# Patient Record
Sex: MALE | Race: OTHER | Employment: FULL TIME | URBAN - METROPOLITAN AREA
[De-identification: names, ages, dates, MRNs, and addresses within clinical notes are randomized per-mention and may not be internally consistent; named-entity substitution may affect disease eponyms.]

---

## 2018-10-09 ENCOUNTER — HOSPITAL ENCOUNTER (EMERGENCY)
Facility: HOSPITAL | Age: 33
Discharge: HOME | End: 2018-10-09
Attending: EMERGENCY MEDICINE
Payer: COMMERCIAL

## 2018-10-09 ENCOUNTER — APPOINTMENT (OUTPATIENT)
Dept: GENERAL RADIOLOGY | Facility: HOSPITAL | Age: 33
End: 2018-10-09
Payer: COMMERCIAL

## 2018-10-09 VITALS
DIASTOLIC BLOOD PRESSURE: 80 MMHG | BODY MASS INDEX: 28 KG/M2 | HEART RATE: 92 BPM | RESPIRATION RATE: 18 BRPM | OXYGEN SATURATION: 96 % | TEMPERATURE: 98 F | WEIGHT: 200 LBS | SYSTOLIC BLOOD PRESSURE: 127 MMHG | HEIGHT: 71 IN

## 2018-10-09 DIAGNOSIS — S22.31XA CLOSED FRACTURE OF ONE RIB OF RIGHT SIDE, INITIAL ENCOUNTER: Primary | ICD-10-CM

## 2018-10-09 PROCEDURE — 99283 EMERGENCY DEPT VISIT LOW MDM: CPT

## 2018-10-09 PROCEDURE — 71101 X-RAY EXAM UNILAT RIBS/CHEST: CPT | Performed by: EMERGENCY MEDICINE

## 2018-10-09 RX ORDER — HYDROCODONE BITARTRATE AND ACETAMINOPHEN 5; 325 MG/1; MG/1
1-2 TABLET ORAL EVERY 6 HOURS PRN
Qty: 12 TABLET | Refills: 0 | Status: SHIPPED | OUTPATIENT
Start: 2018-10-09

## 2018-10-10 NOTE — ED PROVIDER NOTES
Patient Seen in: [facility] Emergency Department    History   Patient presents with:  Trauma (cardiovascular, musculoskeletal)    Stated Complaint: R RIB PAIN    HPI    Patient is a 20-year-old male who presents emergency room with a history of inju normocephalic, atraumatic. Pupils are 3 mm equally round and reactive to light. Oropharynx is clear. Mucous membranes are moist.  NECK: There is no focal tenderness to palpation appreciated. There is no JVD.  No meningeal signs or nuchal rigidity appreciate this time. Disposition and Plan     Clinical Impression:  Closed fracture of one rib of right side, initial encounter  (primary encounter diagnosis)    Disposition:  Discharge  10/9/2018 11:31 pm    Follow-up:  No follow-up provider specified.

## 2018-10-10 NOTE — ED INITIAL ASSESSMENT (HPI)
R rib injury. Per pt fell in a small hole, while doing backyard work, slammed R rim on the wall. Incident 3 days ago.  Pain with deep breathing

## (undated) NOTE — ED AVS SNAPSHOT
Fernando Corijw   MRN: [id number]    Department:  [facility] Emergency Department   Date of Visit:  10/9/2018           Disclosure     Insurance plans vary and the physician(s) referred by the ER may not be covered by your plan.  Please contact your tell this physician (or your personal doctor if your instructions are to return to your personal doctor) about any new or lasting problems. The primary care or specialist physician will see patients referred from the [facility] Emergency Department.  [employer]

## (undated) NOTE — LETTER
Date & Time: 10/9/2018, 11:31 PM  Patient: Jessica Gauthier  Encounter Provider(s):    Johnnie Gould MD       To Whom It May Concern:    Jessica Gauthier was seen and treated in our department on 10/9/2018.  He should not return to work until OCTOBER 13, 21